# Patient Record
Sex: MALE | Race: WHITE
[De-identification: names, ages, dates, MRNs, and addresses within clinical notes are randomized per-mention and may not be internally consistent; named-entity substitution may affect disease eponyms.]

---

## 2019-08-10 ENCOUNTER — HOSPITAL ENCOUNTER (EMERGENCY)
Dept: HOSPITAL 26 - MED | Age: 59
Discharge: HOME | End: 2019-08-10
Payer: COMMERCIAL

## 2019-08-10 VITALS — SYSTOLIC BLOOD PRESSURE: 152 MMHG | DIASTOLIC BLOOD PRESSURE: 95 MMHG

## 2019-08-10 VITALS — HEIGHT: 69 IN | WEIGHT: 174 LBS | BODY MASS INDEX: 25.77 KG/M2

## 2019-08-10 VITALS — DIASTOLIC BLOOD PRESSURE: 102 MMHG | SYSTOLIC BLOOD PRESSURE: 179 MMHG

## 2019-08-10 DIAGNOSIS — L02.414: Primary | ICD-10-CM

## 2019-08-10 PROCEDURE — 99283 EMERGENCY DEPT VISIT LOW MDM: CPT

## 2019-08-10 PROCEDURE — 36415 COLL VENOUS BLD VENIPUNCTURE: CPT

## 2019-08-10 PROCEDURE — 87040 BLOOD CULTURE FOR BACTERIA: CPT

## 2019-08-10 PROCEDURE — 96366 THER/PROPH/DIAG IV INF ADDON: CPT

## 2019-08-10 PROCEDURE — 96365 THER/PROPH/DIAG IV INF INIT: CPT

## 2019-08-10 PROCEDURE — 87186 SC STD MICRODIL/AGAR DIL: CPT

## 2019-08-10 PROCEDURE — 87070 CULTURE OTHR SPECIMN AEROBIC: CPT

## 2019-08-10 PROCEDURE — 10060 I&D ABSCESS SIMPLE/SINGLE: CPT

## 2019-08-10 RX ADMIN — DEXTROSE ONE MLS/HR: 5 SOLUTION INTRAVENOUS at 13:25

## 2019-08-10 RX ADMIN — LIDOCAINE HYDROCHLORIDE SCH MG: 10 INJECTION, SOLUTION INFILTRATION; PERINEURAL at 14:55

## 2019-08-12 ENCOUNTER — HOSPITAL ENCOUNTER (EMERGENCY)
Dept: HOSPITAL 26 - MED | Age: 59
Discharge: HOME | End: 2019-08-12
Payer: COMMERCIAL

## 2019-08-12 VITALS — SYSTOLIC BLOOD PRESSURE: 126 MMHG | DIASTOLIC BLOOD PRESSURE: 76 MMHG

## 2019-08-12 VITALS — BODY MASS INDEX: 25.66 KG/M2 | WEIGHT: 173.25 LBS | HEIGHT: 69 IN

## 2019-08-12 VITALS — SYSTOLIC BLOOD PRESSURE: 169 MMHG | DIASTOLIC BLOOD PRESSURE: 94 MMHG

## 2019-08-12 DIAGNOSIS — L98.8: Primary | ICD-10-CM

## 2019-08-12 DIAGNOSIS — M79.622: ICD-10-CM

## 2019-08-12 DIAGNOSIS — Z98.890: ICD-10-CM

## 2019-08-12 NOTE — NUR
59 Y MALE PT SEEN HERE 2 DAYS AGO FOR I&D FOR ABCESS TO PTS L UPPER ARM AND 
TOLD TO RETURN IN 2 DAYS. PT DENIES PAIN AT THIS TIME. SITE APPEARS TO BE 
HEALING PROPERLY. DENIES FEVER, CHILLS, DISCHARGE. BED IS DOWN, LOCKED, BED 
RAIL X 1, ERMD TO SEE PT.



MEDHX:DENIES

RX:ABX